# Patient Record
Sex: FEMALE | Race: WHITE | NOT HISPANIC OR LATINO | URBAN - METROPOLITAN AREA
[De-identification: names, ages, dates, MRNs, and addresses within clinical notes are randomized per-mention and may not be internally consistent; named-entity substitution may affect disease eponyms.]

---

## 2023-01-09 ENCOUNTER — OFFICE VISIT (OUTPATIENT)
Dept: URBAN - METROPOLITAN AREA SURGERY CENTER 13 | Facility: SURGERY CENTER | Age: 63
End: 2023-01-09
Payer: COMMERCIAL

## 2023-01-09 ENCOUNTER — CLAIMS CREATED FROM THE CLAIM WINDOW (OUTPATIENT)
Dept: URBAN - METROPOLITAN AREA CLINIC 4 | Facility: CLINIC | Age: 63
End: 2023-01-09
Payer: COMMERCIAL

## 2023-01-09 DIAGNOSIS — Z12.11 COLON CANCER SCREENING: ICD-10-CM

## 2023-01-09 DIAGNOSIS — D12.0 BENIGN NEOPLASM OF CECUM: ICD-10-CM

## 2023-01-09 DIAGNOSIS — D12.2 ADENOMA OF ASCENDING COLON: ICD-10-CM

## 2023-01-09 DIAGNOSIS — D12.0 ADENOMA OF CECUM: ICD-10-CM

## 2023-01-09 PROCEDURE — 88305 TISSUE EXAM BY PATHOLOGIST: CPT | Performed by: PATHOLOGY

## 2023-01-09 PROCEDURE — G8907 PT DOC NO EVENTS ON DISCHARG: HCPCS | Performed by: INTERNAL MEDICINE

## 2023-01-09 PROCEDURE — 45385 COLONOSCOPY W/LESION REMOVAL: CPT | Performed by: INTERNAL MEDICINE

## 2023-01-09 RX ORDER — ACETAMINOPHEN 325 MG/1
TAKE 1 TABLET (325 MG) BY ORAL ROUTE EVERY 4 HOURS AS NEEDED TABLET, FILM COATED ORAL
Qty: 0 | Refills: 0 | Status: ACTIVE | COMMUNITY
Start: 1900-01-01 | End: 1900-01-01

## 2023-01-09 RX ORDER — ONDANSETRON 8 MG/1
TAKE 1 TABLET (8 MG) BY ORAL ROUTE 1-2 HOURS PRIOR TO RADIATION THERAPY TABLET ORAL
Qty: 1 | Refills: 0 | Status: ACTIVE | COMMUNITY
Start: 1900-01-01 | End: 1900-01-01

## 2025-06-06 ENCOUNTER — DASHBOARD ENCOUNTERS (OUTPATIENT)
Age: 65
End: 2025-06-06

## 2025-06-06 ENCOUNTER — OFFICE VISIT (OUTPATIENT)
Dept: URBAN - METROPOLITAN AREA CLINIC 42 | Facility: CLINIC | Age: 65
End: 2025-06-06
Payer: COMMERCIAL

## 2025-06-06 DIAGNOSIS — R10.13 EPIGASTRIC PAIN: ICD-10-CM

## 2025-06-06 DIAGNOSIS — K21.9 GASTROESOPHAGEAL REFLUX DISEASE, UNSPECIFIED WHETHER ESOPHAGITIS PRESENT: ICD-10-CM

## 2025-06-06 PROBLEM — 79922009: Status: ACTIVE | Noted: 2025-06-06

## 2025-06-06 PROBLEM — 235595009: Status: ACTIVE | Noted: 2025-06-06

## 2025-06-06 PROCEDURE — 99213 OFFICE O/P EST LOW 20 MIN: CPT

## 2025-06-06 RX ORDER — OMEPRAZOLE 20 MG/1
1 CAPSULE 1/2 TO 1 HOUR BEFORE MORNING MEAL CAPSULE, DELAYED RELEASE ORAL ONCE A DAY
Qty: 30 | OUTPATIENT
Start: 2025-06-06

## 2025-06-06 RX ORDER — ONDANSETRON HYDROCHLORIDE 8 MG/1
TAKE 1 TABLET (8 MG) BY ORAL ROUTE 1-2 HOURS PRIOR TO RADIATION THERAPY TABLET, FILM COATED ORAL
Qty: 1 | Refills: 0 | Status: ON HOLD | COMMUNITY
Start: 1900-01-01

## 2025-06-06 NOTE — PHYSICAL EXAM NEUROLOGIC:
oriented to person, place and time,  short and long term memory intact,  cooperative with exam ear pain

## 2025-06-06 NOTE — HPI-TODAY'S VISIT:
65-year-old female, established patient, presents for evaluation of  epigastric pain  She feels a "ruthie blooming" in her epigastric region. This typically occurs after eating. She further defines this as her stomach is opening and spreading. This can be triggers by exercise and leaning over. She says that eating can also trigger this as well.  She is unable to say how long this lasts for. This occurs  multiple times a day. She denies N/V/GERD/ dysphagia.  She reports that this has been ongoing since she got food poisoining in May, which caused 12 bouts of vomiting, with her last episodes including hematemasis. This was on May 4th. She has not had reoccurence of this agian since. She also noted diarrhea at that time, however, this has resolved now.  She deny abn weight loss  She have a BM QD, no blood or mucus seen in the stool.   Prior EGD: none Prior Colonscopy: 1/2023 colonoscopy to 5 mm polyps in the ascending colon and cecum, one 8 mm polyp in the cecum, diverticulosis in the sigmoid colon, tortuous colon, IH.  Repeat in 5 years.  PATH: TA polyp in the ascending and cecum. Family Hx: none CP/SOB: only from her epigastric discomfort, otherwise none. Recent Cardiology or Pulmonology Eval: none Use of BT/NSAID/ GLP1 use: none

## 2025-06-07 LAB — LIPASE: 32

## 2025-07-01 ENCOUNTER — WEB ENCOUNTER (OUTPATIENT)
Dept: URBAN - METROPOLITAN AREA CLINIC 42 | Facility: CLINIC | Age: 65
End: 2025-07-01

## 2025-07-01 RX ORDER — OMEPRAZOLE 20 MG/1
1 CAPSULE 1/2 TO 1 HOUR BEFORE MORNING MEAL CAPSULE, DELAYED RELEASE ORAL ONCE A DAY
Qty: 30 | Refills: 5
Start: 2025-06-06

## 2025-07-17 ENCOUNTER — OFFICE VISIT (OUTPATIENT)
Dept: URBAN - METROPOLITAN AREA SURGERY CENTER 13 | Facility: SURGERY CENTER | Age: 65
End: 2025-07-17

## 2025-07-24 ENCOUNTER — CLAIMS CREATED FROM THE CLAIM WINDOW (OUTPATIENT)
Dept: URBAN - METROPOLITAN AREA CLINIC 4 | Facility: CLINIC | Age: 65
End: 2025-07-24
Payer: COMMERCIAL

## 2025-07-24 ENCOUNTER — OFFICE VISIT (OUTPATIENT)
Dept: URBAN - METROPOLITAN AREA SURGERY CENTER 13 | Facility: SURGERY CENTER | Age: 65
End: 2025-07-24

## 2025-07-24 DIAGNOSIS — K31.89 OTHER DISEASES OF STOMACH AND DUODENUM: ICD-10-CM

## 2025-07-24 PROCEDURE — 88312 SPECIAL STAINS GROUP 1: CPT | Performed by: PATHOLOGY

## 2025-07-24 PROCEDURE — 88305 TISSUE EXAM BY PATHOLOGIST: CPT | Performed by: PATHOLOGY

## 2025-07-29 ENCOUNTER — TELEPHONE ENCOUNTER (OUTPATIENT)
Dept: URBAN - METROPOLITAN AREA CLINIC 42 | Facility: CLINIC | Age: 65
End: 2025-07-29

## 2025-07-29 RX ORDER — OMEPRAZOLE 20 MG/1
1 CAPSULE 1/2 TO 1 HOUR BEFORE MORNING MEAL CAPSULE, DELAYED RELEASE ORAL ONCE A DAY
Qty: 90 | Refills: 5
Start: 2025-06-06